# Patient Record
Sex: FEMALE | Race: WHITE | Employment: STUDENT | URBAN - METROPOLITAN AREA
[De-identification: names, ages, dates, MRNs, and addresses within clinical notes are randomized per-mention and may not be internally consistent; named-entity substitution may affect disease eponyms.]

---

## 2018-02-15 ENCOUNTER — OFFICE VISIT (OUTPATIENT)
Dept: ORTHOPEDIC SURGERY | Age: 23
End: 2018-02-15

## 2018-02-15 VITALS
WEIGHT: 148 LBS | HEART RATE: 70 BPM | DIASTOLIC BLOOD PRESSURE: 64 MMHG | SYSTOLIC BLOOD PRESSURE: 93 MMHG | BODY MASS INDEX: 25.27 KG/M2 | HEIGHT: 64 IN

## 2018-02-15 DIAGNOSIS — S69.92XA INJURY OF LEFT HAND, INITIAL ENCOUNTER: Primary | ICD-10-CM

## 2018-02-15 DIAGNOSIS — S63.602A SPRAIN OF LEFT THUMB, UNSPECIFIED SITE OF FINGER, INITIAL ENCOUNTER: ICD-10-CM

## 2018-02-15 PROCEDURE — 99203 OFFICE O/P NEW LOW 30 MIN: CPT | Performed by: PHYSICIAN ASSISTANT

## 2018-02-16 NOTE — PROGRESS NOTES
Len Patino was seen at the [facility] . This dictation was done with [employer] dictation and may contain mechanical errors related to translation. The review of systems was currently provided by the patient and reviewed with the medical assistant at today's visit. Please see media. Subjective:  Len Patino is a 23 y.o. who is here complaining of pain in her left thumb pad. 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 She has soreness now and hardness in the thenar eminence and radiating up the first compartment. I sent her for x-rays of the thumb      There is no problem list on file for this patient. No current outpatient prescriptions on file prior to visit. No current facility-administered medications on file prior to visit. Objective:   Blood pressure 93/64, pulse 70, height 5' 4\" (1.626 m), weight 148 lb (67.1 kg). On examination, his very pleasant 81-year-old female no acute distress. She is alert and oriented ×3. She can walk without antalgia. She has good symmetric motion through her hands and fingers. She has swelling and soreness and some hardness around the thenar eminence at the fat pad. She does not have a on her collateral ligament instability. She does have some soreness going up the first compartment and a mildly positive Finkelstein's test. There is no neurological deficits. She has got good capillary refill distally. No cutaneous lesions or lymphadenopathy. Neuro exam grossly intact both lower extremities. Intact sensation to light touch. Motor exam 4+ to 5/5 in all major motor groups. Negative Manning's sign. Skin is warm, dry and intact with out erythema or significant increased temperature around the knee joint(s). There are no cutaneous lesions or lymphadenopathy present.     X-RAYS:  X-rays taken the office today included an